# Patient Record
Sex: MALE | Race: WHITE | NOT HISPANIC OR LATINO | Employment: FULL TIME | URBAN - METROPOLITAN AREA
[De-identification: names, ages, dates, MRNs, and addresses within clinical notes are randomized per-mention and may not be internally consistent; named-entity substitution may affect disease eponyms.]

---

## 2017-01-01 ENCOUNTER — CLINICAL SUPPORT (OUTPATIENT)
Dept: INTERNAL MEDICINE | Facility: CLINIC | Age: 52
End: 2017-01-01
Attending: INTERNAL MEDICINE
Payer: COMMERCIAL

## 2017-01-01 ENCOUNTER — DOCUMENTATION ONLY (OUTPATIENT)
Dept: INTERNAL MEDICINE | Facility: CLINIC | Age: 52
End: 2017-01-01

## 2017-01-01 VITALS
WEIGHT: 209 LBS | HEART RATE: 70 BPM | SYSTOLIC BLOOD PRESSURE: 140 MMHG | BODY MASS INDEX: 29.92 KG/M2 | DIASTOLIC BLOOD PRESSURE: 90 MMHG | OXYGEN SATURATION: 98 % | HEIGHT: 70 IN

## 2017-01-01 VITALS
DIASTOLIC BLOOD PRESSURE: 82 MMHG | WEIGHT: 202 LBS | BODY MASS INDEX: 28.92 KG/M2 | HEART RATE: 78 BPM | OXYGEN SATURATION: 99 % | SYSTOLIC BLOOD PRESSURE: 140 MMHG | HEIGHT: 70 IN

## 2017-01-01 VITALS
HEART RATE: 72 BPM | SYSTOLIC BLOOD PRESSURE: 140 MMHG | WEIGHT: 200 LBS | DIASTOLIC BLOOD PRESSURE: 70 MMHG | OXYGEN SATURATION: 98 % | HEIGHT: 70 IN | BODY MASS INDEX: 28.63 KG/M2

## 2017-01-01 DIAGNOSIS — Z00.00 ROUTINE ADULT HEALTH MAINTENANCE: Primary | ICD-10-CM

## 2017-01-01 DIAGNOSIS — I10 ESSENTIAL HYPERTENSION: ICD-10-CM

## 2017-01-01 DIAGNOSIS — R53.83 OTHER MALAISE AND FATIGUE: ICD-10-CM

## 2017-01-01 DIAGNOSIS — E03.9 HYPOTHYROIDISM, UNSPECIFIED TYPE: ICD-10-CM

## 2017-01-01 DIAGNOSIS — E55.9 VITAMIN D DEFICIENCY: ICD-10-CM

## 2017-01-01 DIAGNOSIS — R07.9 CHEST PAIN, UNSPECIFIED TYPE: ICD-10-CM

## 2017-01-01 DIAGNOSIS — E78.9 DISORDER OF LIPID METABOLISM: ICD-10-CM

## 2017-01-01 DIAGNOSIS — R09.89 CAROTID BRUIT, UNSPECIFIED LATERALITY: ICD-10-CM

## 2017-01-01 DIAGNOSIS — I10 ESSENTIAL HYPERTENSION: Primary | ICD-10-CM

## 2017-01-01 DIAGNOSIS — I51.89 LEFT VENTRICULAR DIASTOLIC DYSFUNCTION, NYHA CLASS 1: ICD-10-CM

## 2017-01-01 DIAGNOSIS — R79.89 OTHER ABNORMAL BLOOD CHEMISTRY: ICD-10-CM

## 2017-01-01 DIAGNOSIS — Z12.5 SCREENING FOR PROSTATE CANCER: ICD-10-CM

## 2017-01-01 DIAGNOSIS — D51.0 PERNICIOUS ANEMIA: ICD-10-CM

## 2017-01-01 DIAGNOSIS — R07.9 CHEST PAIN, UNSPECIFIED TYPE: Primary | ICD-10-CM

## 2017-01-01 DIAGNOSIS — D50.9 IRON DEFICIENCY ANEMIA, UNSPECIFIED IRON DEFICIENCY ANEMIA TYPE: ICD-10-CM

## 2017-01-01 DIAGNOSIS — E78.00 HIGH CHOLESTEROL: ICD-10-CM

## 2017-01-01 DIAGNOSIS — I65.29 STENOSIS OF CAROTID ARTERY, UNSPECIFIED LATERALITY: Primary | ICD-10-CM

## 2017-01-01 DIAGNOSIS — R53.81 OTHER MALAISE AND FATIGUE: ICD-10-CM

## 2017-01-01 LAB
25(OH)D3 SERPL-MCNC: 19 NG/ML (ref 30–100)
ALBUMIN SERPL-MCNC: 4.2 G/DL (ref 3.6–5.1)
ALBUMIN/GLOB SERPL: 1.3 (CALC) (ref 1–2.5)
ALP SERPL-CCNC: 40 U/L (ref 40–115)
ALT SERPL-CCNC: 17 U/L (ref 9–46)
AORTIC MEAN GRADIENT: NORMAL MM HG (ref 0–9.9)
AORTIC PEAK GRADIENT: NORMAL MM HG (ref 0–9.9)
AORTIC PEAK VELOCITY: NORMAL M/S (ref 0–1.9)
AORTIC PRES. HALF TIME: NORMAL M/SEC (ref 599–999)
AORTIC ROOT (DIASTOLE): NORMAL (ref 1.3–3.7)
AORTIC VALVE (DIASTOLE): NORMAL (ref 1.5–2.6)
AORTIC VALVE AREA: NORMAL CM2 (ref 2.5–3.5)
AST SERPL-CCNC: 17 U/L (ref 10–35)
BASOPHILS # BLD AUTO: 20 CELLS/UL (ref 0–200)
BASOPHILS NFR BLD AUTO: 0.4 %
BILIRUB SERPL-MCNC: 0.4 MG/DL (ref 0.2–1.2)
BUN SERPL-MCNC: 15 MG/DL (ref 7–25)
BUN/CREAT SERPL: ABNORMAL (CALC) (ref 6–22)
CALCIUM SERPL-MCNC: 9.6 MG/DL (ref 8.6–10.3)
CHLORIDE SERPL-SCNC: 98 MMOL/L (ref 98–110)
CHOLEST SERPL-MCNC: 217 MG/DL (ref 125–200)
CHOLEST/HDLC SERPL: 7.8 (CALC)
CO2 SERPL-SCNC: 32 MMOL/L (ref 20–31)
CREAT SERPL-MCNC: 0.92 MG/DL (ref 0.7–1.33)
CRP SERPL-MCNC: 0.98 MG/DL
EOSINOPHIL # BLD AUTO: 255 CELLS/UL (ref 15–500)
EOSINOPHIL NFR BLD AUTO: 5.1 %
ERYTHROCYTE [DISTWIDTH] IN BLOOD BY AUTOMATED COUNT: 12.7 % (ref 11–15)
GFR SERPL CREATININE-BSD FRML MDRD: 96 ML/MIN/1.73M2
GLOBULIN SER CALC-MCNC: 3.2 G/DL (CALC) (ref 1.9–3.7)
GLUCOSE SERPL-MCNC: 83 MG/DL (ref 65–99)
HBA1C MFR BLD: 5.9 % OF TOTAL HGB
HCT VFR BLD AUTO: 41.7 % (ref 38.5–50)
HCV AB S/CO SERPL IA: 0.02
HCV AB SERPL QL IA: NORMAL
HDLC SERPL-MCNC: 28 MG/DL
HGB BLD-MCNC: 14.1 G/DL (ref 13.2–17.1)
LDLC SERPL CALC-MCNC: 145 MG/DL (CALC)
LEFT ATRIUM (DIASTOLE): NORMAL (ref 2.5–4)
LEFT VENTRICLE (DIASTOLE): NORMAL (ref 3.5–5.5)
LEFT VENTRICLE (SYSTOLE): NORMAL (ref 2.2–4)
LYMPHOCYTES # BLD AUTO: 1480 CELLS/UL (ref 850–3900)
LYMPHOCYTES NFR BLD AUTO: 29.6 %
MCH RBC QN AUTO: 30.2 PG (ref 27–33)
MCHC RBC AUTO-ENTMCNC: 33.7 G/DL (ref 32–36)
MCV RBC AUTO: 89.9 FL (ref 80–100)
MITRAL PRES. HALF TIME: NORMAL M/SEC (ref 30–60)
MITRAL VALVE AREA: NORMAL CM2 (ref 4–6)
MITRAL VALVE E-A RATIO: NORMAL (ref 0.75–999)
MITRAL VALVE E-E PRIME: NORMAL (ref 0–7)
MONOCYTES # BLD AUTO: 420 CELLS/UL (ref 200–950)
MONOCYTES NFR BLD AUTO: 8.4 %
NEUTROPHILS # BLD AUTO: 2825 CELLS/UL (ref 1500–7800)
NEUTROPHILS NFR BLD AUTO: 56.5 %
NONHDLC SERPL-MCNC: 189 MG/DL (CALC)
PLATELET # BLD AUTO: 211 THOUSAND/UL (ref 140–400)
PMV BLD REES-ECKER: 8.3 FL (ref 7.5–12.5)
POSTERIOR WALL (SYSTOLE): NORMAL (ref 0.5–1.2)
POSTERIOR WALL(DIASTOLE): NORMAL (ref 0.6–1.2)
POTASSIUM SERPL-SCNC: 3.8 MMOL/L (ref 3.5–5.3)
PROT SERPL-MCNC: 7.4 G/DL (ref 6.1–8.1)
PSA SERPL-MCNC: 0.7 NG/ML
PULMONARY ARTERY PRESSURE: NORMAL MM HG (ref 0–29)
RBC # BLD AUTO: 4.65 MILLION/UL (ref 4.2–5.8)
RETIRED EF AND QEF - SEE NOTES: NORMAL %
RIGHT VENT (DIASTOLE): NORMAL (ref 0.8–2.6)
SEPTUM (DIASTOLE): NORMAL (ref 0.6–1.2)
SEPTUM (SYSTOLE): NORMAL (ref 0.5–1.1)
SODIUM SERPL-SCNC: 136 MMOL/L (ref 135–146)
TRIGL SERPL-MCNC: 220 MG/DL
TSH SERPL-ACNC: 2.8 MIU/L (ref 0.4–4.5)
VIT B12 SERPL-MCNC: 398 PG/ML (ref 200–1100)
VITAMIN D2 SERPL-MCNC: <4 NG/ML
VITAMIN D3 SERPL-MCNC: 19 NG/ML
WBC # BLD AUTO: 5 THOUSAND/UL (ref 3.8–10.8)

## 2017-01-01 PROCEDURE — G0444 DEPRESSION SCREEN ANNUAL: HCPCS | Mod: S$GLB,,, | Performed by: INTERNAL MEDICINE

## 2017-01-01 PROCEDURE — 3077F SYST BP >= 140 MM HG: CPT | Mod: S$GLB,,, | Performed by: INTERNAL MEDICINE

## 2017-01-01 PROCEDURE — 1160F RVW MEDS BY RX/DR IN RCRD: CPT | Mod: S$GLB,,, | Performed by: INTERNAL MEDICINE

## 2017-01-01 PROCEDURE — 93351 STRESS TTE COMPLETE: CPT | Mod: S$GLB,,, | Performed by: INTERNAL MEDICINE

## 2017-01-01 PROCEDURE — 99213 OFFICE O/P EST LOW 20 MIN: CPT | Mod: S$GLB,,, | Performed by: INTERNAL MEDICINE

## 2017-01-01 PROCEDURE — 3080F DIAST BP >= 90 MM HG: CPT | Mod: S$GLB,,, | Performed by: INTERNAL MEDICINE

## 2017-01-01 PROCEDURE — 3079F DIAST BP 80-89 MM HG: CPT | Mod: S$GLB,,, | Performed by: INTERNAL MEDICINE

## 2017-01-01 PROCEDURE — 99396 PREV VISIT EST AGE 40-64: CPT | Mod: 25,S$GLB,, | Performed by: INTERNAL MEDICINE

## 2017-01-01 PROCEDURE — G0442 ANNUAL ALCOHOL SCREEN 15 MIN: HCPCS | Mod: S$GLB,,, | Performed by: INTERNAL MEDICINE

## 2017-01-01 PROCEDURE — 93306 TTE W/DOPPLER COMPLETE: CPT | Mod: S$GLB,,, | Performed by: INTERNAL MEDICINE

## 2017-01-01 PROCEDURE — 93880 EXTRACRANIAL BILAT STUDY: CPT | Mod: 51,S$GLB,, | Performed by: INTERNAL MEDICINE

## 2017-01-01 RX ORDER — VALSARTAN AND HYDROCHLOROTHIAZIDE 320; 12.5 MG/1; MG/1
1 TABLET, FILM COATED ORAL DAILY
Qty: 90 TABLET | Refills: 3 | Status: SHIPPED | OUTPATIENT
Start: 2017-01-01 | End: 2018-06-19

## 2017-01-01 RX ORDER — VIT C/E/ZN/COPPR/LUTEIN/ZEAXAN 250MG-90MG
1000 CAPSULE ORAL DAILY
COMMUNITY

## 2017-01-01 RX ORDER — VALSARTAN 160 MG/1
160 TABLET ORAL DAILY
Qty: 30 TABLET | Refills: 2 | Status: SHIPPED | OUTPATIENT
Start: 2017-01-01 | End: 2017-01-01 | Stop reason: SDUPTHER

## 2017-01-01 RX ORDER — VALSARTAN AND HYDROCHLOROTHIAZIDE 320; 12.5 MG/1; MG/1
1 TABLET, FILM COATED ORAL DAILY
Qty: 30 TABLET | Refills: 5 | Status: SHIPPED | OUTPATIENT
Start: 2017-01-01 | End: 2017-01-01 | Stop reason: SDUPTHER

## 2017-01-01 RX ORDER — VALSARTAN 160 MG/1
TABLET ORAL
Qty: 30 TABLET | Refills: 2
Start: 2017-01-01 | End: 2017-01-01

## 2017-01-26 NOTE — MR AVS SNAPSHOT
"[facility]  [address] [place] [address], [address]  [facility] [address]  Phone: [phone]  Fax: [phone]                  Marcio Bateman   2017 1:00 PM   Office Visit    Description:  Male : 1965   Provider:  LAY Thomson MD   Department:  [facility]           Reason for Visit     Annual Exam           Diagnoses this Visit        Comments    Routine adult health maintenance    -  Primary     Chest pain, unspecified type         Carotid bruit, unspecified laterality                To Do List           Future Appointments        Provider Department Dept Phone    2017 1:00 PM Mango Mitchell RN [facility] [phone]    2017 1:30 PM DARCY Evans [phone]    2017 1:15 PM LAY Thomson MD [facility] [phone]      Goals (5 Years of Data)     None      Follow-Up and Disposition     Return in about 3 months (around 2017).      [facility] On Call     [facility] On Call Nurse [facility] Line - 24/ Assistance  Registered nurses in the [facility] On Call Center provide clinical advisement, health education, appointment booking, and other advisory services.  Call for this free service at [phone].             Medications           Message regarding Medications     Verify the changes and/or additions to your medication regime listed below are the same as discussed with your clinician today.  If any of these changes or additions are incorrect, please notify your healthcare provider.             Verify that the below list of medications is an accurate representation of the medications you are currently taking.  If none reported, the list may be blank. If incorrect, please contact your healthcare provider. Carry this list with you in case of emergency.                Clinical Reference Information           Vital Signs - Last Recorded  Most recent update: 2017  1:31 PM by Delma Davison MA    BP Pulse Ht Wt SpO2 BMI    (!) 140/70 72 5' 10" (1.778 m) 90.7 kg (200 lb) 98% 28.7 kg/m2    "   Blood Pressure          Most Recent Value    BP  (!)  140/70      Allergies as of 1/26/2017     No Known Allergies      Immunizations Administered on Date of Encounter - 1/26/2017     None      Orders Placed During Today's Visit     Future Labs/Procedures Expected by Expires    CAR Ultrasound doppler carotid bliateral  As directed 1/26/2018    Exercise stress echo  As directed 1/26/2018      Instructions      Low-Salt Diet  This diet removes foods that are high in salt. It also limits the amount of salt you use when cooking. It is most often used for people with high blood pressure, edema (fluid retention), and kidney, liver, or heart disease.  Table salt contains the mineral sodium. Your body needs sodium to work normally. But too much sodium can make your health problems worse. Your healthcare provider is recommending a low-salt (also called low-sodium) diet for you. Your total daily allowance of salt is 1,500 to 2,300 milligrams (mg). It is less than 1 teaspoon of table salt. This means you can have only about 500 to 700 mg of sodium at each meal. People with certain health problems should limit salt intake to the lower end of the recommended range.    When you cook, dont add much salt. If you can cook without using salt, even better. Dont add salt to your food at the table.  When shopping, read food labels. Salt is often called sodium on the label. Choose foods that are salt-free, low salt, or very low salt. Note that foods with reduced salt may not lower your salt intake enough.    Beans, potatoes, and pasta  Ok: Dry beans, split peas, lentils, potatoes, rice, macaroni, pasta, spaghetti without added salt  Avoid: Potato chips, tortilla chips, and similar products  Breads and cereals  Ok: Low-sodium breads, rolls, cereals, and cakes; low-salt crackers, matzo crackers  Avoid: Salted crackers, pretzels, popcorn, Croatian toast, pancakes, muffins  Dairy  Ok: Milk, chocolate milk, hot chocolate mix, low-salt  cheeses, and yogurt  Avoid: Processed cheese and cheese spreads; Roquefort, Camembert, and cottage cheese; buttermilk, instant breakfast drink  Desserts  Ok: Ice cream, frozen yogurt, juice bars, gelatin, cookies and pies, sugar, honey, jelly, hard candy  Avoid: Most pies, cakes and cookies prepared or processed with salt; instant pudding  Drinks  Ok: Tea, coffee, fizzy (carbonated) drinks, juices  Avoid: Flavored coffees, electrolyte replacement drinks, sports drinks  Meats  Ok: All fresh meat, fish, poultry, low-salt tuna, eggs, egg substitute  Avoid: Smoked, pickled, brine-cured, or salted meats and fish. This includes motta, chipped beef, corned beef, hot dogs, deli meats, ham, kosher meats, salt pork, sausage, canned tuna, salted codfish, smoked salmon, herring, sardines, or anchovies.  Seasonings and spices  Ok: Most seasonings are okay. Good substitutes for salt include: fresh herb blends, hot sauce, lemon, garlic, evangelista, vinegar, dry mustard, parsley, cilantro, horseradish, tomato paste, regular margarine, mayonnaise, unsalted butter, cream cheese, vegetable oil, cream, low-salt salad dressing and gravy.  Avoid: Regular ketchup, relishes, pickles, soy sauce, teriyaki sauce, Worcestershire sauce, BBQ sauce, tartar sauce, meat tenderizer, chili sauce, regular gravy, regular salad dressing, salted butter  Soups  Ok: Low-salt soups and broths made with allowed foods  Avoid: Bouillon cubes, soups with smoked or salted meats, regular soup and broth  Vegetables  Ok: Most vegetables are okay; also low-salt tomato and vegetable juices  Avoid: Sauerkraut and other brine-soaked vegetables; pickles and other pickled vegetables; tomato juice, olives  © 8141-6861 [employer]. [address] [place] [address], [place] [address]. All rights reserved. This information is not intended as a substitute for professional medical care. Always follow your healthcare professional's instructions.

## 2017-01-26 NOTE — PROGRESS NOTES
Subjective:       Patient ID: Marcio Bateman is a 51 y.o. male.    Chief Complaint: Annual Exam    HPI Comments: Poor diet and off exercise for over 6 mo.    Review of Systems   Constitutional: Negative.    Respiratory: Negative.    Cardiovascular: Positive for chest pain.       Objective:      Physical Exam   Constitutional: He appears well-developed and well-nourished.   HENT:   Head: Normocephalic and atraumatic.   Eyes: Pupils are equal, round, and reactive to light.   Cardiovascular: Normal rate, regular rhythm and normal heart sounds.    Pulmonary/Chest: Effort normal.   Musculoskeletal: He exhibits no edema.   Neurological: He is alert.       Assessment:       1. Routine adult health maintenance    2. Chest pain, unspecified type    3. Carotid bruit, unspecified laterality        Plan:       Per orders and D/C instructions.  SE and carotid US to evaluate for CAD. Low Sodium diet for high BP.       Screening: The patient was screened for depression with the PHQ2 questionnaire and possible health consequences were discussed with the patient, who understands (15 minutes spent). The patient was screened for the misuse of alcohol, by asking the number of drinks per average week, and if pt has had more than 4 drinks (more than 3 for women and elderly) in 1 day within the past year. The health and legal consequences of misuse were discussed (15 minutes spent). The patient was screened for obesity (BMI>30), If the current BMI > 30, then the possible consequences of obesity, as well as the benefits of diet, exercise, and weight loss were discussed (15 minutes spent).

## 2017-01-26 NOTE — PATIENT INSTRUCTIONS

## 2017-01-31 NOTE — MR AVS SNAPSHOT
[facility]  [address] [facility], [address] [address]  Phone: [phone]  Fax: [phone]                  Marcio GRAY Fransico   2017 1:30 PM   Clinical Support    Description:  Male : 1965   Provider:  Mango Mitchell RN   Department:  [facility]           Diagnoses this Visit        Comments    Carotid bruit, unspecified laterality                To Do List           Future Appointments        Provider Department Dept Phone    2017 1:15 PM MD [facility] [place] [phone]      Goals (5 Years of Data)     None      [facility]     [facility] Care [facility] -  Assistance  Registered nurses in the [facility] On Call Center provide clinical advisement, health education, appointment booking, and other advisory services.  Call for this free service at [phone].             Medications           Message regarding Medications     Verify the changes and/or additions to your medication regime listed below are the same as discussed with your clinician today.  If any of these changes or additions are incorrect, please notify your healthcare provider.             Verify that the below list of medications is an accurate representation of the medications you are currently taking.  If none reported, the list may be blank. If incorrect, please contact your healthcare provider. Carry this list with you in case of emergency.                Clinical Reference Information           Allergies as of 2017     No Known Allergies      Immunizations Administered on Date of Encounter - 2017     None      Orders Placed During Today's Visit      Normal Orders This Visit    CAR Ultrasound doppler carotid bliateral

## 2017-02-01 PROBLEM — I51.89 LEFT VENTRICULAR DIASTOLIC DYSFUNCTION, NYHA CLASS 1: Status: ACTIVE | Noted: 2017-01-01

## 2017-02-01 PROBLEM — I10 ESSENTIAL HYPERTENSION: Status: ACTIVE | Noted: 2017-01-01

## 2017-02-23 NOTE — MR AVS SNAPSHOT
[facility]  [address] [facility], [facility] [address]  [facility] [address]  Phone: [phone]  Fax: [phone]                  Marcio Bateman   2017 2:00 PM   Office Visit    Description:  Male : 1965   Provider:  LAY Thomson MD   Department:  [facility]           Reason for Visit     Follow-up           Diagnoses this Visit        Comments    Chest pain, unspecified type    -  Primary            To Do List           Future Appointments        Provider Department Dept Phone    3/7/2017 10:00 AM Mango Mitchell RN [facility] [phone]    2017 2:15 PM MD [facility] [place] [phone]      Goals (5 Years of Data)     None      Follow-Up and Disposition     Return in about 3 months (around 2017).       These Medications        Disp Refills Start End    valsartan-hydrochlorothiazide (DIOVAN-HCT) 320-12.5 mg per tablet 30 tablet 5 2017    Take 1 tablet by mouth once daily. - Oral    Pharmacy: [id number] - [address] [place] Ph #: [phone]         [facility] Call     [facility] On Call Nurse Care Line -  Assistance  Registered nurses in the [facility] On Call Center provide clinical advisement, health education, appointment booking, and other advisory services.  Call for this free service at [phone].             Medications           Message regarding Medications     Verify the changes and/or additions to your medication regime listed below are the same as discussed with your clinician today.  If any of these changes or additions are incorrect, please notify your healthcare provider.        START taking these NEW medications        Refills    valsartan-hydrochlorothiazide (DIOVAN-HCT) 320-12.5 mg per tablet 5    Sig: Take 1 tablet by mouth once daily.    Class: Normal    Route: Oral      STOP taking these medications     valsartan (DIOVAN) 160 MG tablet Take 2 tablets by mouth daily           Verify that the below list of medications is  "an accurate representation of the medications you are currently taking.  If none reported, the list may be blank. If incorrect, please contact your healthcare provider. Carry this list with you in case of emergency.           Current Medications     valsartan-hydrochlorothiazide (DIOVAN-HCT) 320-12.5 mg per tablet Take 1 tablet by mouth once daily.           Clinical Reference Information           Your Vitals Were     BP Pulse Height Weight SpO2 BMI    140/82 78 5' 10" (1.778 m) 91.6 kg (202 lb) 99% 28.98 kg/m2      Blood Pressure          Most Recent Value    BP  (!)  140/82      Allergies as of 2/23/2017     No Known Allergies      Immunizations Administered on Date of Encounter - 2/23/2017     None      Orders Placed During Today's Visit     Future Labs/Procedures Expected by Expires    Exercise stress echo  As directed 2/23/2018      Language Assistance Services     ATTENTION: Language assistance services are available, free of charge. Please call [phone].      ATENCIÓN: Si habla marco a, tiene a pandya disposición servicios gratuitos de asistencia lingüística. Llame al [phone].     SHAHNAZ Ý: N?u b?n nói Ti?ng Vi?t, có các d?ch v? h? tr? ngôn ng? mi?n phí dành cho b?n. G?i s? [phone].         [facility] complies with applicable Federal civil rights laws and does not discriminate on the basis of race, color, national origin, age, disability, or sex.        "

## 2017-02-24 NOTE — PROGRESS NOTES
Subjective:       Patient ID: Marcio Bateman is a 51 y.o. male.    Chief Complaint: Follow-up (HTN)    HPI Comments: On Diovan 320 mg. Feels fine.  TQ=432/90 at home.    Review of Systems   Constitutional: Negative.    Respiratory: Negative.    Cardiovascular: Negative.        Objective:      Physical Exam   Constitutional: He appears well-developed and well-nourished.   HENT:   Head: Normocephalic and atraumatic.   Eyes: Pupils are equal, round, and reactive to light.   Cardiovascular: Normal rate, regular rhythm and normal heart sounds.    Pulmonary/Chest: Effort normal.   Musculoskeletal: He exhibits no edema.   Neurological: He is alert.       Assessment:       1. Chest pain, unspecified type    2. Essential hypertension        Plan:       Per orders and D/C instructions.  Change to Diovan /12.5 mg for HTN.

## 2017-03-07 NOTE — PROGRESS NOTES
Pt presents for SE.  BP was 162/118 upon presentation, which is too high to safely stress.  Will reschedule when normotensive.

## 2017-03-07 NOTE — MR AVS SNAPSHOT
[facility]  [address] [facility], [address] [address]  Phone: [phone]  Fax: [phone]                  Marcio Bateman   3/7/2017 10:00 AM   Clinical Support    Description:  Male : 1965   Provider:  Mango Mitchell RN   Department:  [facility]           Diagnoses this Visit        Comments    Chest pain, unspecified type                To Do List           Future Appointments        Provider Department Dept Phone    2017 2:15 PM MD [facility] [place] [phone]      Goals (5 Years of Data)     None      [facility]     [facility] Care [facility] -  Assistance  Registered nurses in the [facility] On Call Center provide clinical advisement, health education, appointment booking, and other advisory services.  Call for this free service at [phone].             Medications           Message regarding Medications     Verify the changes and/or additions to your medication regime listed below are the same as discussed with your clinician today.  If any of these changes or additions are incorrect, please notify your healthcare provider.             Verify that the below list of medications is an accurate representation of the medications you are currently taking.  If none reported, the list may be blank. If incorrect, please contact your healthcare provider. Carry this list with you in case of emergency.           Current Medications     valsartan-hydrochlorothiazide (DIOVAN-HCT) 320-12.5 mg per tablet Take 1 tablet by mouth once daily.           Clinical Reference Information           Allergies as of 3/7/2017     No Known Allergies      Immunizations Administered on Date of Encounter - 3/7/2017     None      Orders Placed During Today's Visit      Normal Orders This Visit    Exercise stress echo       Language Assistance Services     ATTENTION: Language assistance services are available, free of charge. Please call [phone].      ATENCIÓN: radha Helm  a pandya disposición servicios gratuitos de asistencia lingüística. Llliset al [phone].     SHAHNAZ Ý: N?u b?n nói Ti?ng Vi?t, có các d?ch v? h? tr? ngôn ng? mi?n phí dành cho b?n. G?i s? [phone].         [facility] complies with applicable Federal civil rights laws and does not discriminate on the basis of race, color, national origin, age, disability, or sex.

## 2017-03-14 NOTE — MR AVS SNAPSHOT
[facility]  [address] [facility], [address] [address]  Phone: [phone]  Fax: [phone]                  Marcio GRAY Depanderson   3/14/2017 2:00 PM   Clinical Support    Description:  Male : 1965   Provider:  Mango Mitchell RN   Department:  [facility]           Diagnoses this Visit        Comments    Chest pain, unspecified type                To Do List           Future Appointments        Provider Department Dept Phone    2017 2:15 PM MD [facility] [place] [phone]      Goals (5 Years of Data)     None      [facility]     [facility] Care Line -  Assistance  Registered nurses in the [facility] On Call Center provide clinical advisement, health education, appointment booking, and other advisory services.  Call for this free service at [phone].             Medications           Message regarding Medications     Verify the changes and/or additions to your medication regime listed below are the same as discussed with your clinician today.  If any of these changes or additions are incorrect, please notify your healthcare provider.             Verify that the below list of medications is an accurate representation of the medications you are currently taking.  If none reported, the list may be blank. If incorrect, please contact your healthcare provider. Carry this list with you in case of emergency.           Current Medications     valsartan-hydrochlorothiazide (DIOVAN-HCT) 320-12.5 mg per tablet Take 1 tablet by mouth once daily.           Clinical Reference Information           Allergies as of 3/14/2017     No Known Allergies      Immunizations Administered on Date of Encounter - 3/14/2017     None      Orders Placed During Today's Visit      Normal Orders This Visit    Exercise stress echo       Language Assistance Services     ATTENTION: Language assistance services are available, free of charge. Please call [phone].      ATENCIÓN: Lissett strickland  tiene a pandya disposición servicios gratuitos de asistencia lingüística. Llame al [phone].     SHAHNAZ Ý: N?u b?n nói Ti?ng Vi?t, có các d?ch v? h? tr? ngôn ng? mi?n phí dành cho b?n. G?i s? [phone].         [facility] complies with applicable Federal civil rights laws and does not discriminate on the basis of race, color, national origin, age, disability, or sex.

## 2017-03-15 PROBLEM — Z92.89 H/O CARDIOVASCULAR STRESS TEST: Status: ACTIVE | Noted: 2017-01-01

## 2017-05-23 PROBLEM — E78.00 HIGH CHOLESTEROL: Status: ACTIVE | Noted: 2017-01-01

## 2017-05-23 NOTE — PROGRESS NOTES
Subjective:       Patient ID: Marcio Bateman is a 51 y.o. male.    Chief Complaint: Follow-up (2 month)    Anxious about waiting here and being late for an appt. Aways gets anxious while waiting (lines, traffic, etc).  Recetn BP-120-130/80-90      Review of Systems   Constitutional: Negative.    Respiratory: Negative.    Cardiovascular: Negative.        Objective:      Physical Exam   Constitutional: He appears well-developed and well-nourished.   HENT:   Head: Normocephalic and atraumatic.   Eyes: Pupils are equal, round, and reactive to light.   Cardiovascular: Normal rate, regular rhythm and normal heart sounds.    Pulmonary/Chest: Effort normal.   Musculoskeletal: He exhibits no edema.   Neurological: He is alert.       Assessment:       1. Essential hypertension        Plan:       Per orders and D/C instructions.  Cont current meds for HTN.     Check labs next visit and start statin if still high cholest.

## 2025-05-07 NOTE — MR AVS SNAPSHOT
[facility]  [address] [facility], [address] [address]  Phone: [phone]  Fax: [phone]                  Marcio GRAY Fransico   2017 1:00 PM   Clinical Support    Description:  Male : 1965   Provider:  Mango Mitchell RN   Department:  [facility]           Diagnoses this Visit        Comments    Chest pain, unspecified type                To Do List           Future Appointments        Provider Department Dept Phone    2017 1:15 PM MD [facility] [place] [phone]      Goals (5 Years of Data)     None      [facility]     [facility] Call Nurse Care Line -  Assistance  Registered nurses in the [facility] On Call Center provide clinical advisement, health education, appointment booking, and other advisory services.  Call for this free service at [phone].             Medications           Message regarding Medications     Verify the changes and/or additions to your medication regime listed below are the same as discussed with your clinician today.  If any of these changes or additions are incorrect, please notify your healthcare provider.             Verify that the below list of medications is an accurate representation of the medications you are currently taking.  If none reported, the list may be blank. If incorrect, please contact your healthcare provider. Carry this list with you in case of emergency.                Clinical Reference Information           Allergies as of 2017     No Known Allergies      Immunizations Administered on Date of Encounter - 2017     None      Orders Placed During Today's Visit      Normal Orders This Visit    Exercise stress echo        [[id number]]